# Patient Record
Sex: MALE | Race: WHITE | NOT HISPANIC OR LATINO | Employment: FULL TIME | URBAN - METROPOLITAN AREA
[De-identification: names, ages, dates, MRNs, and addresses within clinical notes are randomized per-mention and may not be internally consistent; named-entity substitution may affect disease eponyms.]

---

## 2019-02-28 LAB
INFLUENZA A ANTIGEN, POC: POSITIVE
INFLUENZA B ANTIGEN, POC: POSITIVE
RAPID GROUP A STREP (OHS): NEGATIVE

## 2020-05-27 ENCOUNTER — HISTORICAL (OUTPATIENT)
Dept: ADMINISTRATIVE | Facility: HOSPITAL | Age: 25
End: 2020-05-27

## 2020-05-27 LAB
ABS NEUT (OLG): 4.3 X10(3)/MCL (ref 2.1–9.2)
ALBUMIN SERPL-MCNC: 4.8 GM/DL (ref 3.4–5)
ALBUMIN/GLOB SERPL: 1.78 {RATIO} (ref 1.5–2.5)
ALP SERPL-CCNC: 72 UNIT/L (ref 38–126)
ALT SERPL-CCNC: 51 UNIT/L (ref 7–52)
APPEARANCE, UA: CLEAR
AST SERPL-CCNC: 30 UNIT/L (ref 15–37)
BACTERIA #/AREA URNS AUTO: ABNORMAL /HPF
BILIRUB SERPL-MCNC: 0.8 MG/DL (ref 0.2–1)
BILIRUB UR QL STRIP: ABNORMAL MG/DL
BILIRUBIN DIRECT+TOT PNL SERPL-MCNC: 0.2 MG/DL (ref 0–0.5)
BILIRUBIN DIRECT+TOT PNL SERPL-MCNC: 0.6 MG/DL
BUN SERPL-MCNC: 14 MG/DL (ref 7–18)
CALCIUM SERPL-MCNC: 9.8 MG/DL (ref 8.5–10)
CHLORIDE SERPL-SCNC: 107 MMOL/L (ref 98–107)
CHOLEST SERPL-MCNC: 165 MG/DL (ref 0–200)
CHOLEST/HDLC SERPL: 4.6 {RATIO}
CO2 SERPL-SCNC: 26 MMOL/L (ref 21–32)
COLOR UR: YELLOW
CREAT SERPL-MCNC: 1.11 MG/DL (ref 0.6–1.3)
ERYTHROCYTE [DISTWIDTH] IN BLOOD BY AUTOMATED COUNT: 13 % (ref 11.5–17)
GLOBULIN SER-MCNC: 2.7 GM/DL (ref 1.2–3)
GLUCOSE (UA): NEGATIVE MG/DL
GLUCOSE SERPL-MCNC: 83 MG/DL (ref 74–106)
HCT VFR BLD AUTO: 48.6 % (ref 42–52)
HDLC SERPL-MCNC: 36 MG/DL (ref 35–60)
HGB BLD-MCNC: 16.8 GM/DL (ref 14–18)
HGB UR QL STRIP: NEGATIVE UNIT/L
KETONES UR QL STRIP: ABNORMAL MG/DL
LDLC SERPL CALC-MCNC: 110 MG/DL (ref 0–129)
LEUKOCYTE ESTERASE UR QL STRIP: NEGATIVE UNIT/L
LYMPHOCYTES # BLD AUTO: 3.4 X10(3)/MCL (ref 0.6–3.4)
LYMPHOCYTES NFR BLD AUTO: 39.9 % (ref 13–40)
MCH RBC QN AUTO: 29.5 PG (ref 27–31.2)
MCHC RBC AUTO-ENTMCNC: 35 GM/DL (ref 32–36)
MCV RBC AUTO: 85 FL (ref 80–94)
MONOCYTES # BLD AUTO: 0.7 X10(3)/MCL (ref 0.1–1.3)
MONOCYTES NFR BLD AUTO: 8.3 % (ref 0.1–24)
NEUTROPHILS NFR BLD AUTO: 51.8 % (ref 47–80)
NITRITE UR QL STRIP.AUTO: NEGATIVE
PH UR STRIP: 6 [PH]
PLATELET # BLD AUTO: 243 X10(3)/MCL (ref 130–400)
PMV BLD AUTO: 9.6 FL (ref 9.4–12.4)
POTASSIUM SERPL-SCNC: 4.3 MMOL/L (ref 3.5–5.1)
PROT SERPL-MCNC: 7.5 GM/DL (ref 6.4–8.2)
PROT UR QL STRIP: NEGATIVE MG/DL
RBC # BLD AUTO: 5.7 X10(6)/MCL (ref 4.7–6.1)
RBC #/AREA URNS HPF: ABNORMAL /HPF
SODIUM SERPL-SCNC: 142 MMOL/L (ref 136–145)
SP GR UR STRIP: 1.02
SQUAMOUS EPITHELIAL, UA: ABNORMAL /LPF
TRIGL SERPL-MCNC: 116 MG/DL (ref 30–150)
UROBILINOGEN UR STRIP-ACNC: 0.2 MG/DL
VLDLC SERPL CALC-MCNC: 23.2 MG/DL
WBC # SPEC AUTO: 8.4 X10(3)/MCL (ref 4.5–11.5)
WBC #/AREA URNS AUTO: ABNORMAL /[HPF]

## 2022-04-10 ENCOUNTER — HISTORICAL (OUTPATIENT)
Dept: ADMINISTRATIVE | Facility: HOSPITAL | Age: 27
End: 2022-04-10

## 2022-04-25 VITALS
HEIGHT: 69 IN | DIASTOLIC BLOOD PRESSURE: 80 MMHG | BODY MASS INDEX: 32.43 KG/M2 | OXYGEN SATURATION: 97 % | WEIGHT: 218.94 LBS | SYSTOLIC BLOOD PRESSURE: 118 MMHG

## 2022-07-18 PROBLEM — F90.0 ATTENTION DEFICIT HYPERACTIVITY DISORDER (ADHD), PREDOMINANTLY INATTENTIVE TYPE: Status: ACTIVE | Noted: 2022-07-18

## 2022-07-18 PROBLEM — F32.A DEPRESSIVE DISORDER: Status: ACTIVE | Noted: 2022-07-18

## 2022-07-18 PROBLEM — E66.9 OBESITY: Status: ACTIVE | Noted: 2022-07-18

## 2022-07-18 PROBLEM — F41.1 GENERALIZED ANXIETY DISORDER: Status: ACTIVE | Noted: 2022-07-18

## 2022-07-19 PROBLEM — R03.0 ELEVATED BLOOD-PRESSURE READING WITHOUT DIAGNOSIS OF HYPERTENSION: Status: ACTIVE | Noted: 2022-07-19

## 2022-09-18 ENCOUNTER — HISTORICAL (OUTPATIENT)
Dept: ADMINISTRATIVE | Facility: HOSPITAL | Age: 27
End: 2022-09-18

## 2022-11-16 PROBLEM — R03.0 ELEVATED BLOOD-PRESSURE READING WITHOUT DIAGNOSIS OF HYPERTENSION: Status: RESOLVED | Noted: 2022-07-19 | Resolved: 2022-11-16

## 2022-11-16 PROBLEM — I10 HYPERTENSION: Status: ACTIVE | Noted: 2022-11-16

## 2022-12-12 PROBLEM — Z00.00 WELLNESS EXAMINATION: Status: ACTIVE | Noted: 2022-12-12

## 2023-03-13 PROBLEM — Z00.00 WELLNESS EXAMINATION: Status: RESOLVED | Noted: 2022-12-12 | Resolved: 2023-03-13

## 2023-03-17 ENCOUNTER — PATIENT MESSAGE (OUTPATIENT)
Dept: RESEARCH | Facility: HOSPITAL | Age: 28
End: 2023-03-17

## 2023-10-05 PROBLEM — Z23 IMMUNIZATION DUE: Status: ACTIVE | Noted: 2023-10-05

## 2023-11-01 PROBLEM — E66.09 OBESITY DUE TO EXCESS CALORIES WITHOUT SERIOUS COMORBIDITY: Status: ACTIVE | Noted: 2022-07-18

## 2023-11-03 PROBLEM — E66.811 CLASS 1 OBESITY DUE TO EXCESS CALORIES WITHOUT SERIOUS COMORBIDITY WITH BODY MASS INDEX (BMI) OF 34.0 TO 34.9 IN ADULT: Status: ACTIVE | Noted: 2022-07-18

## 2024-02-05 PROBLEM — Z00.00 WELLNESS EXAMINATION: Status: RESOLVED | Noted: 2022-12-12 | Resolved: 2024-02-05

## 2024-03-11 DIAGNOSIS — F32.A DEPRESSIVE DISORDER: ICD-10-CM

## 2024-03-11 RX ORDER — VENLAFAXINE HYDROCHLORIDE 75 MG/1
75 CAPSULE, EXTENDED RELEASE ORAL DAILY
Qty: 30 CAPSULE | Refills: 11 | Status: SHIPPED | OUTPATIENT
Start: 2024-03-11 | End: 2024-04-15 | Stop reason: DRUGHIGH

## 2024-04-14 NOTE — PROGRESS NOTES
.ADHD (Refill Vyvanse and Adderall )         HPI:    Patient presents for recheck/refill medications.  Patient states medications are working well; he is able to concentrate, focus and passed.  He denies anxiety, palpitations, unintentional weight loss, headaches or dry mouth.   reviewed.    Narcotics agreement updated 07/03/2023.    Current Outpatient Medications:     lisinopriL-hydrochlorothiazide (PRINZIDE,ZESTORETIC) 20-25 mg Tab, Take 1 tablet by mouth once daily., Disp: 30 tablet, Rfl: 11    venlafaxine (EFFEXOR-XR) 75 MG 24 hr capsule, Take 1 capsule by mouth once daily, Disp: 30 capsule, Rfl: 11    dextroamphetamine-amphetamine (ADDERALL) 20 mg tablet, Take 1 tablet by mouth once daily. In the afternoon, Disp: 30 tablet, Rfl: 0    dextroamphetamine-amphetamine (ADDERALL) 20 mg tablet, Take 1 tablet by mouth daily in afternoon., Disp: 30 tablet, Rfl: 0    dextroamphetamine-amphetamine (ADDERALL) 20 mg tablet, Take 1 tablet by mouth once daily. Fill: 3/15/2024., Disp: 30 tablet, Rfl: 0    lisdexamfetamine (VYVANSE) 70 MG capsule, Take 1 capsule (70 mg total) by mouth every morning., Disp: 30 capsule, Rfl: 0    lisdexamfetamine (VYVANSE) 70 MG capsule, Take 1 capsule (70 mg total) by mouth every morning. Fill: 2/16/2024., Disp: 30 capsule, Rfl: 0    lisdexamfetamine (VYVANSE) 70 MG capsule, Take 1 capsule (70 mg total) by mouth every morning., Disp: 30 capsule, Rfl: 0      ROS:    He has been feeling well.    He feels his anxiety has been increased; multifactorial. No panic attacks.  He denies any sadness or depression  He has been sleeping well for the most part. He feels tired in am. He has excessive daytime sleepiness at times. + snoring. His sleep is noisy at times.  He started to go to gym in the morning 3 days a week.  His appetite has been good; he cut out sodas/soft drinks.   He does not check his blood pressures regularly; 140's/80's.        PE:    ..  Vitals:    01/18/24 0905   BP: 136/86   Pulse:  110   Temp: 98.4 °F (36.9 °C)        General:  He is a well-developed well-nourished obese white male in no apparent distress.  He is alert and oriented.    Chest: Clear to auscultation bilaterally.    CV:  Regular rate rhythm without murmurs rubs gallops    Problem List as of 4/15/2024 Reviewed: 4/15/2024 10:47 AM by Rajinder Patton MD         Psychiatric    Attention deficit hyperactivity disorder (ADHD), predominantly inattentive type    Last Assessment & Plan 7/19/2022 Office Visit Written 7/19/2022  4:50 PM by Rajinder Patton MD     Patient is doing well on some medications; refills x 3 for each given.         Depressive disorder    Last Assessment & Plan 7/19/2022 Office Visit Written 7/19/2022  4:51 PM by Rajinder Patton MD     Patient states his mood has been doing well; continue current medication.         Generalized anxiety disorder    Last Assessment & Plan 7/19/2022 Office Visit Written 7/19/2022  4:51 PM by Rajinder Patton MD     Patient states his anxiety is well controlled; continue current medication.            Cardiac/Vascular    Primary hypertension       Endocrine    Class 1 obesity due to excess calories without serious comorbidity with body mass index (BMI) of 34.0 to 34.9 in adult       Other    Suspected sleep apnea            1. Attention deficit hyperactivity disorder (ADHD), predominantly inattentive type  Overview:  Stable/patient doing well on current treatment, will continue to closely monitor, follow-up in 3 months. 6 prescriptions printed and hand given to patient at office visit today for 3-month supply. Risks/benefits/side effects of medication discussed with patient. Patient denies any insomnia or palpitations.     07/03/2023: Patient is doing well on medications; refills x3 for each medication given.    Narcotics agreement updated.    10/05/2023: Patient is doing well on medications; refills x3 for each medication given.    10/05/2023:  Patient took Vyvanse 50 mg secondary to  availability issues.  Will resume 70 mg dosage; refills x3 given.  Continue Adderall 20 mg; refills x3 given.    01/18/2024: Patient is doing well on medications; refills x3 for each medication given.    Orders:  -     lisdexamfetamine (VYVANSE) 70 MG capsule; Take 1 capsule (70 mg total) by mouth every morning.  Dispense: 30 capsule; Refill: 0  -     dextroamphetamine-amphetamine (ADDERALL) 20 mg tablet; Take 1 tablet by mouth once daily. In the afternoon  Dispense: 30 tablet; Refill: 0    2. Primary hypertension  Overview:  Stable and well controlled at this time. Continue current treatment. Limit salt in diet. Monitor BP at home.     07/03/2023: Patient's blood pressure continues to be well controlled.    Continue current medication.    Patient encouraged to lose weight.    10/05/2023:  His blood pressure is well controlled.    11/03/2023: His blood pressure is well controlled; continue current medication.    01/18/2024:  Patient's blood pressure is 136/86 today; he has not been checking his blood pressures.  Patient advised to monitor pressures and let us know how they are doing.  Limit sodium consumption.    Patient encouraged to increase physical activity, exercise and lose weight.          3. Class 1 obesity due to excess calories without serious comorbidity with body mass index (BMI) of 34.0 to 34.9 in adult  Overview:  Patient encouraged to make healthy food choices and limit portions.    Patient encouraged to limit intake of fried foods, processed foods and sugary foods.    Patient encouraged to increase physical activity, exercise and lose weight.    01/18/2024:  Patient encouraged to make healthy food choices and limit portions.    Patient encouraged to limit intake of fried foods, processed foods and sugary foods.    Patient encouraged to increase physical activity, exercise and lose weight.      Other orders  -     lisdexamfetamine (VYVANSE) 70 MG capsule; Take 1 capsule (70 mg total) by mouth every  morning. Fill: 2/16/2024.  Dispense: 30 capsule; Refill: 0  -     lisdexamfetamine (VYVANSE) 70 MG capsule; Take 1 capsule (70 mg total) by mouth every morning.  Dispense: 30 capsule; Refill: 0  -     dextroamphetamine-amphetamine (ADDERALL) 20 mg tablet; Take 1 tablet by mouth daily in afternoon.  Dispense: 30 tablet; Refill: 0  -     dextroamphetamine-amphetamine (ADDERALL) 20 mg tablet; Take 1 tablet by mouth once daily. Fill: 3/15/2024.  Dispense: 30 tablet; Refill: 0              ..Follow up in about 3 months (around 4/18/2024) for Recheck/refill meds.

## 2024-04-15 ENCOUNTER — OFFICE VISIT (OUTPATIENT)
Dept: PRIMARY CARE CLINIC | Facility: CLINIC | Age: 29
End: 2024-04-15
Payer: COMMERCIAL

## 2024-04-15 VITALS
SYSTOLIC BLOOD PRESSURE: 142 MMHG | HEART RATE: 97 BPM | BODY MASS INDEX: 34.58 KG/M2 | HEIGHT: 68 IN | RESPIRATION RATE: 18 BRPM | DIASTOLIC BLOOD PRESSURE: 87 MMHG | TEMPERATURE: 98 F | WEIGHT: 228.13 LBS | OXYGEN SATURATION: 95 %

## 2024-04-15 DIAGNOSIS — F90.0 ATTENTION DEFICIT HYPERACTIVITY DISORDER (ADHD), PREDOMINANTLY INATTENTIVE TYPE: Primary | ICD-10-CM

## 2024-04-15 DIAGNOSIS — F41.1 GENERALIZED ANXIETY DISORDER: ICD-10-CM

## 2024-04-15 DIAGNOSIS — F32.A DEPRESSIVE DISORDER: ICD-10-CM

## 2024-04-15 DIAGNOSIS — R29.818 SUSPECTED SLEEP APNEA: ICD-10-CM

## 2024-04-15 DIAGNOSIS — I10 PRIMARY HYPERTENSION: ICD-10-CM

## 2024-04-15 PROCEDURE — 1159F MED LIST DOCD IN RCRD: CPT | Mod: CPTII,,, | Performed by: FAMILY MEDICINE

## 2024-04-15 PROCEDURE — 3079F DIAST BP 80-89 MM HG: CPT | Mod: CPTII,,, | Performed by: FAMILY MEDICINE

## 2024-04-15 PROCEDURE — 3008F BODY MASS INDEX DOCD: CPT | Mod: CPTII,,, | Performed by: FAMILY MEDICINE

## 2024-04-15 PROCEDURE — 3077F SYST BP >= 140 MM HG: CPT | Mod: CPTII,,, | Performed by: FAMILY MEDICINE

## 2024-04-15 PROCEDURE — 99214 OFFICE O/P EST MOD 30 MIN: CPT | Mod: ,,, | Performed by: FAMILY MEDICINE

## 2024-04-15 PROCEDURE — 4010F ACE/ARB THERAPY RXD/TAKEN: CPT | Mod: CPTII,,, | Performed by: FAMILY MEDICINE

## 2024-04-15 RX ORDER — VENLAFAXINE HYDROCHLORIDE 150 MG/1
150 CAPSULE, EXTENDED RELEASE ORAL DAILY
Qty: 30 CAPSULE | Refills: 1 | Status: SHIPPED | OUTPATIENT
Start: 2024-04-15 | End: 2024-06-14

## 2024-04-15 RX ORDER — LISDEXAMFETAMINE DIMESYLATE 70 MG/1
70 CAPSULE ORAL EVERY MORNING
Qty: 30 CAPSULE | Refills: 0 | Status: SHIPPED | OUTPATIENT
Start: 2024-04-15

## 2024-04-15 RX ORDER — LISDEXAMFETAMINE DIMESYLATE 70 MG/1
70 CAPSULE ORAL EVERY MORNING
Qty: 30 CAPSULE | Refills: 0 | Status: SHIPPED | OUTPATIENT
Start: 2024-04-15 | End: 2024-05-15

## 2024-04-15 RX ORDER — PROMETHAZINE HYDROCHLORIDE AND DEXTROMETHORPHAN HYDROBROMIDE 6.25; 15 MG/5ML; MG/5ML
SYRUP ORAL
COMMUNITY
Start: 2023-12-06

## 2024-04-15 RX ORDER — DEXTROAMPHETAMINE SACCHARATE, AMPHETAMINE ASPARTATE, DEXTROAMPHETAMINE SULFATE AND AMPHETAMINE SULFATE 5; 5; 5; 5 MG/1; MG/1; MG/1; MG/1
1 TABLET ORAL DAILY
Qty: 30 TABLET | Refills: 0 | Status: SHIPPED | OUTPATIENT
Start: 2024-04-15

## 2024-04-15 RX ORDER — DEXTROAMPHETAMINE SACCHARATE, AMPHETAMINE ASPARTATE, DEXTROAMPHETAMINE SULFATE AND AMPHETAMINE SULFATE 5; 5; 5; 5 MG/1; MG/1; MG/1; MG/1
TABLET ORAL
Qty: 30 TABLET | Refills: 0 | Status: SHIPPED | OUTPATIENT
Start: 2024-04-15

## 2024-04-26 ENCOUNTER — PATIENT MESSAGE (OUTPATIENT)
Dept: PRIMARY CARE CLINIC | Facility: CLINIC | Age: 29
End: 2024-04-26
Payer: COMMERCIAL

## 2024-05-06 ENCOUNTER — TELEPHONE (OUTPATIENT)
Dept: PRIMARY CARE CLINIC | Facility: CLINIC | Age: 29
End: 2024-05-06
Payer: COMMERCIAL

## 2024-05-06 NOTE — TELEPHONE ENCOUNTER
----- Message from Shyann Mariee sent at 5/6/2024 10:24 AM CDT -----  Regarding: General Message  General Message:    Beverley soliman/ [facility] @ [phone], she stated she did received the referral but they are needing an demo sheet, dx code and last office visit faxed to [phone]

## 2024-05-10 ENCOUNTER — PATIENT MESSAGE (OUTPATIENT)
Dept: PRIMARY CARE CLINIC | Facility: CLINIC | Age: 29
End: 2024-05-10
Payer: COMMERCIAL

## 2024-06-26 ENCOUNTER — PATIENT OUTREACH (OUTPATIENT)
Facility: CLINIC | Age: 29
End: 2024-06-26
Payer: COMMERCIAL

## 2024-06-26 NOTE — PROGRESS NOTES
Value base Outreach  Call to patient to obtain remote BP, no answer left message. Health maintenance Topics

## 2024-07-20 NOTE — PROGRESS NOTES
I have reviewed the patient name: Gerardo Be, YOB: 1995   I have verbally reviewed the past medical history, active medication list, and allergies with the patient (parent/ legal guardian for a patient under the age of 18 years old) and verified with picture ID if applicable .  I have verified that this patient is in the state [facility].      HPI:    Patient presents for recheck/refill medications.  Patient states medications are working well; he is able to concentrate, focus and passed.  He denies anxiety, palpitations, unintentional weight loss, headaches or dry mouth.   reviewed.          Current Outpatient Medications   Medication Instructions    dextroamphetamine-amphetamine (ADDERALL) 20 mg tablet 1 tablet, Oral, Daily, In the afternoon    dextroamphetamine-amphetamine (ADDERALL) 20 mg tablet Take 1 tablet by mouth daily in afternoon. Fill: 8/23/2024.    dextroamphetamine-amphetamine (ADDERALL) 20 mg tablet Take 1 tablet by mouth after lunch. Fill: 9/23/2024.    lisdexamfetamine (VYVANSE) 70 mg, Oral, Every morning    lisdexamfetamine (VYVANSE) 70 mg, Oral, Every morning, Fill: 8/23/2024.    lisdexamfetamine (VYVANSE) 70 mg, Oral, Every morning, Fill: 9/23/2024.    lisinopriL-hydrochlorothiazide (PRINZIDE,ZESTORETIC) 20-25 mg Tab 1 tablet, Oral, Daily    venlafaxine (EFFEXOR-XR) 75 mg, Oral         ROS:    He has been feeling well.    He has been sleeping okay. He had a sleep study which confirmed   His appetite has been good.  He has been eating smaller portions.  His anxiety and depression have been      PE:    ..There were no vitals taken for this visit.     General:  He is a well-developed well-nourished obese white male in no apparent distress.  He is alert and oriented.            1. Attention deficit hyperactivity disorder (ADHD), predominantly inattentive type  Overview:  Stable/patient doing well on current treatment, will continue to closely monitor, follow-up in 3 months. 6  prescriptions printed and hand given to patient at office visit today for 3-month supply. Risks/benefits/side effects of medication discussed with patient. Patient denies any insomnia or palpitations.     07/03/2023: Patient is doing well on medications; refills x3 for each medication given.    Narcotics agreement updated.    10/05/2023: Patient is doing well on medications; refills x3 for each medication given.    10/05/2023:  Patient took Vyvanse 50 mg secondary to availability issues.  Will resume 70 mg dosage; refills x3 given.  Continue Adderall 20 mg; refills x3 given.    01/18/2024: Patient is doing well on medications; refills x3 for each medication given.    04/15/2024: Patient is doing well on medications; refills x3 for each medication given.    07/23/2024: Patient is doing well on medications; refills x3 for each medications sent.    Orders:  -     dextroamphetamine-amphetamine (ADDERALL) 20 mg tablet; Take 1 tablet by mouth once daily. In the afternoon  Dispense: 30 tablet; Refill: 0  -     lisdexamfetamine (VYVANSE) 70 MG capsule; Take 1 capsule (70 mg total) by mouth every morning.  Dispense: 30 capsule; Refill: 0    2. Generalized anxiety disorder  Overview:  Stable with Effexor 75mg.     11/03/2023: Patient states his anxiety has been doing well; denies any panic attacks.    Continue current medication.    04/15/2024:  Patient reports an increase in anxiety lately; he would like to increase his venlafaxine dosage.    Will increase to 150 mg daily.    Patient advised to call in one-month with update.  He should call sooner if his symptoms worsen.    07/23/2024:  Patient reports his anxiety has improved.    His work is stressful at times.    He felt apathetic on 150 mg dosage; therefore, he is back on 75 mg dosage.    Patient to keep me posted with his progress.      3. Depressive disorder  Overview:  Patient is stable on current medication.  Patient denies any suicidal harmful or  thoughts.    11/03/2023:  Patient states his depression has been doing well; he denies any sadness, depression or crying spells.  He denies any suicidal thoughts.  Continue current medication.    04/15/2024: Patient states his depression has been increased; he denies any suicidal or harmful thoughts.    Will increase venlafaxine extended release 150 mg daily.      07/23/2024:  Patient felt apathetic on 150 mg dosage.  Therefore, he decrease his dose back to 75 daily.  He states his depression has improved; he is trying to be more physically active.  He will let me know if we need to increase his dosage; I advised him that we could go up by 37.5 mg as needed.  Patient denies any suicidal or harmful thoughts; ER precautions given.      4. Primary hypertension  Overview:  Stable and well controlled at this time. Continue current treatment. Limit salt in diet. Monitor BP at home.     07/03/2023: Patient's blood pressure continues to be well controlled.    Continue current medication.    Patient encouraged to lose weight.    10/05/2023:  His blood pressure is well controlled.    11/03/2023: His blood pressure is well controlled; continue current medication.    01/18/2024:  Patient's blood pressure is 136/86 today; he has not been checking his blood pressures.  Patient advised to monitor pressures and let us know how they are doing.  Limit sodium consumption.    Patient encouraged to increase physical activity, exercise and lose weight.    04/15/2024:  His blood pressure is not ideally controlled.    Patient encouraged to lose weight.    Limit sodium consumption.    Patient advised to monitor pressures and let us know how they are doing.    07/23/2024: Patient states his blood pressure has been controlled; his blood pressure this a.m. was 112/84.          5. Obstructive sleep apnea syndrome  Overview:  07/23/2024: Patient did a home sleep study which confirmed that he had obstructive sleep apnea.    Patient picked up mask  this a.m.      Other orders  -     dextroamphetamine-amphetamine (ADDERALL) 20 mg tablet; Take 1 tablet by mouth daily in afternoon. Fill: 8/23/2024.  Dispense: 30 tablet; Refill: 0  -     dextroamphetamine-amphetamine (ADDERALL) 20 mg tablet; Take 1 tablet by mouth after lunch. Fill: 9/23/2024.  Dispense: 30 tablet; Refill: 0  -     lisdexamfetamine (VYVANSE) 70 MG capsule; Take 1 capsule (70 mg total) by mouth every morning. Fill: 8/23/2024.  Dispense: 30 capsule; Refill: 0  -     lisdexamfetamine (VYVANSE) 70 MG capsule; Take 1 capsule (70 mg total) by mouth every morning. Fill: 9/23/2024.  Dispense: 30 capsule; Refill: 0        Note: A total of 30 minutes was spent reviewing patient's chart, talking to patient and documentation.  Note: This was an audio visual visit.      ..No follow-ups on file.       No future appointments.      Answers submitted by the patient for this visit:  Review of Systems Questionnaire (Submitted on 7/23/2024)  activity change: Yes  unexpected weight change: No  neck pain: No  hearing loss: No  rhinorrhea: No  trouble swallowing: No  eye discharge: No  visual disturbance: No  chest tightness: No  wheezing: No  chest pain: No  palpitations: No  blood in stool: No  constipation: No  vomiting: No  diarrhea: No  polydipsia: No  polyuria: No  difficulty urinating: No  urgency: No  hematuria: No  joint swelling: No  arthralgias: No  headaches: No  weakness: No  confusion: No  dysphoric mood: No

## 2024-07-23 ENCOUNTER — OFFICE VISIT (OUTPATIENT)
Dept: PRIMARY CARE CLINIC | Facility: CLINIC | Age: 29
End: 2024-07-23
Payer: COMMERCIAL

## 2024-07-23 DIAGNOSIS — G47.33 OBSTRUCTIVE SLEEP APNEA SYNDROME: ICD-10-CM

## 2024-07-23 DIAGNOSIS — I10 PRIMARY HYPERTENSION: ICD-10-CM

## 2024-07-23 DIAGNOSIS — F90.0 ATTENTION DEFICIT HYPERACTIVITY DISORDER (ADHD), PREDOMINANTLY INATTENTIVE TYPE: Primary | ICD-10-CM

## 2024-07-23 DIAGNOSIS — F32.A DEPRESSIVE DISORDER: ICD-10-CM

## 2024-07-23 DIAGNOSIS — F41.1 GENERALIZED ANXIETY DISORDER: ICD-10-CM

## 2024-07-23 PROBLEM — G47.30 SLEEP APNEA, UNSPECIFIED: Status: ACTIVE | Noted: 2024-05-01

## 2024-07-23 PROCEDURE — 1159F MED LIST DOCD IN RCRD: CPT | Mod: CPTII,95,, | Performed by: FAMILY MEDICINE

## 2024-07-23 PROCEDURE — 99214 OFFICE O/P EST MOD 30 MIN: CPT | Mod: 95,,, | Performed by: FAMILY MEDICINE

## 2024-07-23 PROCEDURE — 4010F ACE/ARB THERAPY RXD/TAKEN: CPT | Mod: CPTII,95,, | Performed by: FAMILY MEDICINE

## 2024-07-23 RX ORDER — VENLAFAXINE HYDROCHLORIDE 75 MG/1
75 CAPSULE, EXTENDED RELEASE ORAL
COMMUNITY
Start: 2024-07-03

## 2024-07-23 RX ORDER — LISDEXAMFETAMINE DIMESYLATE 70 MG/1
70 CAPSULE ORAL EVERY MORNING
Qty: 30 CAPSULE | Refills: 0 | Status: SHIPPED | OUTPATIENT
Start: 2024-07-23

## 2024-07-23 RX ORDER — DEXTROAMPHETAMINE SACCHARATE, AMPHETAMINE ASPARTATE, DEXTROAMPHETAMINE SULFATE AND AMPHETAMINE SULFATE 5; 5; 5; 5 MG/1; MG/1; MG/1; MG/1
TABLET ORAL
Qty: 30 TABLET | Refills: 0 | Status: SHIPPED | OUTPATIENT
Start: 2024-07-23

## 2024-07-23 RX ORDER — DEXTROAMPHETAMINE SACCHARATE, AMPHETAMINE ASPARTATE, DEXTROAMPHETAMINE SULFATE AND AMPHETAMINE SULFATE 5; 5; 5; 5 MG/1; MG/1; MG/1; MG/1
1 TABLET ORAL DAILY
Qty: 30 TABLET | Refills: 0 | Status: SHIPPED | OUTPATIENT
Start: 2024-07-23

## 2024-07-23 RX ORDER — LISDEXAMFETAMINE DIMESYLATE 70 MG/1
70 CAPSULE ORAL EVERY MORNING
Qty: 30 CAPSULE | Refills: 0 | Status: SHIPPED | OUTPATIENT
Start: 2024-07-23 | End: 2024-08-22

## 2024-10-22 ENCOUNTER — PATIENT OUTREACH (OUTPATIENT)
Facility: CLINIC | Age: 29
End: 2024-10-22
Payer: COMMERCIAL

## 2024-10-22 VITALS — DIASTOLIC BLOOD PRESSURE: 84 MMHG | SYSTOLIC BLOOD PRESSURE: 112 MMHG

## 2024-10-22 NOTE — PROGRESS NOTES
I have reviewed the patient name: Gerardo Be, YOB: 1995   I have verbally reviewed the past medical history, active medication list, and allergies with the patient (parent/ legal guardian for a patient under the age of 18 years old) and verified with picture ID if applicable .  I have verified that this patient is in the state [facility].     Medication Refill       HPI:    Patient presents for recheck/refill medications.  Patient states medications are working well; he is able to concentrate, focus and passed.  He denies anxiety, palpitations, unintentional weight loss, headaches or dry mouth.   reviewed.      Current Outpatient Medications   Medication Instructions    dextroamphetamine-amphetamine (ADDERALL) 20 mg tablet 1 tablet, Oral, Daily, In the afternoon    dextroamphetamine-amphetamine (ADDERALL) 20 mg tablet Take 1 tablet by mouth daily in afternoon. Fill: 11/22/2024.    dextroamphetamine-amphetamine (ADDERALL) 20 mg tablet Take 1 tablet by mouth after lunch. Fill: 12/23/2024.    lisdexamfetamine (VYVANSE) 70 mg, Oral, Every morning    lisdexamfetamine (VYVANSE) 70 mg, Oral, Every morning, Fill: 11/22/2024.    lisdexamfetamine (VYVANSE) 70 mg, Oral, Every morning, Fill: 12/23/2024.    lisinopriL (PRINIVIL,ZESTRIL) 20 mg, Oral, Daily    lisinopriL-hydrochlorothiazide (PRINZIDE,ZESTORETIC) 20-25 mg Tab 1 tablet, Oral, Daily    venlafaxine (EFFEXOR-XR) 75 mg         ROS:    He has been feeling well.    He has been sleeping   His appetite has been good.  He has been eating smaller portions.  His anxiety and depression have been doing well.  His home blood pressures has been in the low 140s over low 90s.      PE:    ..There were no vitals taken for this visit.     Via telemedicine, he is well-developed well-nourished obese white male in no apparent distress.  He is alert and oriented.  His affect is appropriate.        1. Attention deficit hyperactivity disorder (ADHD), predominantly  inattentive type  Overview:  Stable/patient doing well on current treatment, will continue to closely monitor, follow-up in 3 months. 6 prescriptions printed and hand given to patient at office visit today for 3-month supply. Risks/benefits/side effects of medication discussed with patient. Patient denies any insomnia or palpitations.     07/03/2023: Patient is doing well on medications; refills x3 for each medication given.    Narcotics agreement updated.    10/05/2023: Patient is doing well on medications; refills x3 for each medication given.    10/05/2023:  Patient took Vyvanse 50 mg secondary to availability issues.  Will resume 70 mg dosage; refills x3 given.  Continue Adderall 20 mg; refills x3 given.    01/18/2024: Patient is doing well on medications; refills x3 for each medication given.    04/15/2024: Patient is doing well on medications; refills x3 for each medication given.    07/23/2024: Patient is doing well on medications; refills x3 for each medications sent.    Assessment & Plan:  Patient is doing well on medications; refills x3 for each medications sent.    Orders:  -     dextroamphetamine-amphetamine (ADDERALL) 20 mg tablet; Take 1 tablet by mouth once daily. In the afternoon  Dispense: 30 tablet; Refill: 0    2. Generalized anxiety disorder  Overview:  Stable with Effexor 75mg.     11/03/2023: Patient states his anxiety has been doing well; denies any panic attacks.    Continue current medication.    04/15/2024:  Patient reports an increase in anxiety lately; he would like to increase his venlafaxine dosage.    Will increase to 150 mg daily.    Patient advised to call in one-month with update.  He should call sooner if his symptoms worsen.    07/23/2024:  Patient reports his anxiety has improved.    His work is stressful at times.    He felt apathetic on 150 mg dosage; therefore, he is back on 75 mg dosage.    Patient to keep me posted with his progress.    Assessment & Plan:  His anxiety has  been doing well; continue current medication.      3. Depressive disorder  Overview:  Patient is stable on current medication.  Patient denies any suicidal harmful or thoughts.    11/03/2023:  Patient states his depression has been doing well; he denies any sadness, depression or crying spells.  He denies any suicidal thoughts.  Continue current medication.    04/15/2024: Patient states his depression has been increased; he denies any suicidal or harmful thoughts.    Will increase venlafaxine extended release 150 mg daily.      07/23/2024:  Patient felt apathetic on 150 mg dosage.  Therefore, he decrease his dose back to 75 daily.  He states his depression has improved; he is trying to be more physically active.  He will let me know if we need to increase his dosage; I advised him that we could go up by 37.5 mg as needed.  Patient denies any suicidal or harmful thoughts; ER precautions given.    Assessment & Plan:  Patient states his depression has been doing well; denies any sadness, depression, crying spells or suicidal thoughts.  Continue venlafaxine.      4. Primary hypertension  Overview:  Stable and well controlled at this time. Continue current treatment. Limit salt in diet. Monitor BP at home.     07/03/2023: Patient's blood pressure continues to be well controlled.    Continue current medication.    Patient encouraged to lose weight.    10/05/2023:  His blood pressure is well controlled.    11/03/2023: His blood pressure is well controlled; continue current medication.    01/18/2024:  Patient's blood pressure is 136/86 today; he has not been checking his blood pressures.  Patient advised to monitor pressures and let us know how they are doing.  Limit sodium consumption.    Patient encouraged to increase physical activity, exercise and lose weight.    04/15/2024:  His blood pressure is not ideally controlled.    Patient encouraged to lose weight.    Limit sodium consumption.    Patient advised to monitor  pressures and let us know how they are doing.    07/23/2024: Patient states his blood pressure has been controlled; his blood pressure this a.m. was 112/84.        Assessment & Plan:  Patient states his home blood pressures has been in the low 140s over low 90s.  Add lisinopril 20 mg daily.  Continue lisinopril HCT 20-25 mg daily.    Continue to monitor pressures and let us know how they are doing in 2-3 weeks.      Other orders  -     lisdexamfetamine (VYVANSE) 70 MG capsule; Take 1 capsule (70 mg total) by mouth every morning.  Dispense: 30 capsule; Refill: 0  -     lisdexamfetamine (VYVANSE) 70 MG capsule; Take 1 capsule (70 mg total) by mouth every morning. Fill: 11/22/2024.  Dispense: 30 capsule; Refill: 0  -     lisdexamfetamine (VYVANSE) 70 MG capsule; Take 1 capsule (70 mg total) by mouth every morning. Fill: 12/23/2024.  Dispense: 30 capsule; Refill: 0  -     dextroamphetamine-amphetamine (ADDERALL) 20 mg tablet; Take 1 tablet by mouth daily in afternoon. Fill: 11/22/2024.  Dispense: 30 tablet; Refill: 0  -     dextroamphetamine-amphetamine (ADDERALL) 20 mg tablet; Take 1 tablet by mouth after lunch. Fill: 12/23/2024.  Dispense: 30 tablet; Refill: 0  -     lisinopriL (PRINIVIL,ZESTRIL) 20 MG tablet; Take 1 tablet (20 mg total) by mouth once daily.  Dispense: 30 tablet; Refill: 1                ..No follow-ups on file.       No future appointments.        Answers submitted by the patient for this visit:  Review of Systems Questionnaire (Submitted on 10/22/2024)  activity change: No  unexpected weight change: No  neck pain: No  hearing loss: No  rhinorrhea: No  trouble swallowing: No  eye discharge: No  visual disturbance: No  chest tightness: No  wheezing: No  chest pain: No  palpitations: No  blood in stool: No  constipation: No  vomiting: No  diarrhea: No  polydipsia: No  polyuria: No  difficulty urinating: No  urgency: No  hematuria: No  joint swelling: No  arthralgias: No  headaches: No  weakness:  No  confusion: No  dysphoric mood: No

## 2024-10-23 ENCOUNTER — OFFICE VISIT (OUTPATIENT)
Dept: PRIMARY CARE CLINIC | Facility: CLINIC | Age: 29
End: 2024-10-23
Payer: COMMERCIAL

## 2024-10-23 DIAGNOSIS — F41.1 GENERALIZED ANXIETY DISORDER: ICD-10-CM

## 2024-10-23 DIAGNOSIS — I10 PRIMARY HYPERTENSION: ICD-10-CM

## 2024-10-23 DIAGNOSIS — F32.A DEPRESSIVE DISORDER: ICD-10-CM

## 2024-10-23 DIAGNOSIS — F90.0 ATTENTION DEFICIT HYPERACTIVITY DISORDER (ADHD), PREDOMINANTLY INATTENTIVE TYPE: Primary | ICD-10-CM

## 2024-10-23 PROCEDURE — 4010F ACE/ARB THERAPY RXD/TAKEN: CPT | Mod: CPTII,95,, | Performed by: FAMILY MEDICINE

## 2024-10-23 PROCEDURE — 1159F MED LIST DOCD IN RCRD: CPT | Mod: CPTII,95,, | Performed by: FAMILY MEDICINE

## 2024-10-23 PROCEDURE — 99214 OFFICE O/P EST MOD 30 MIN: CPT | Mod: 95,,, | Performed by: FAMILY MEDICINE

## 2024-10-23 RX ORDER — LISDEXAMFETAMINE DIMESYLATE 70 MG/1
70 CAPSULE ORAL EVERY MORNING
Qty: 30 CAPSULE | Refills: 0 | Status: SHIPPED | OUTPATIENT
Start: 2024-10-23 | End: 2024-11-22

## 2024-10-23 RX ORDER — DEXTROAMPHETAMINE SACCHARATE, AMPHETAMINE ASPARTATE, DEXTROAMPHETAMINE SULFATE AND AMPHETAMINE SULFATE 5; 5; 5; 5 MG/1; MG/1; MG/1; MG/1
1 TABLET ORAL DAILY
Qty: 30 TABLET | Refills: 0 | Status: SHIPPED | OUTPATIENT
Start: 2024-10-23

## 2024-10-23 RX ORDER — DEXTROAMPHETAMINE SACCHARATE, AMPHETAMINE ASPARTATE, DEXTROAMPHETAMINE SULFATE AND AMPHETAMINE SULFATE 5; 5; 5; 5 MG/1; MG/1; MG/1; MG/1
TABLET ORAL
Qty: 30 TABLET | Refills: 0 | Status: SHIPPED | OUTPATIENT
Start: 2024-10-23

## 2024-10-23 RX ORDER — LISINOPRIL 20 MG/1
20 TABLET ORAL DAILY
Qty: 30 TABLET | Refills: 1 | Status: SHIPPED | OUTPATIENT
Start: 2024-10-23 | End: 2024-12-22

## 2024-10-23 NOTE — ASSESSMENT & PLAN NOTE
Patient states his home blood pressures has been in the low 140s over low 90s.  Add lisinopril 20 mg daily.  Continue lisinopril HCT 20-25 mg daily.    Continue to monitor pressures and let us know how they are doing in 2-3 weeks.

## 2024-10-23 NOTE — ASSESSMENT & PLAN NOTE
Patient states his depression has been doing well; denies any sadness, depression, crying spells or suicidal thoughts.  Continue venlafaxine.

## 2025-01-24 ENCOUNTER — OFFICE VISIT (OUTPATIENT)
Dept: PRIMARY CARE CLINIC | Facility: CLINIC | Age: 30
End: 2025-01-24
Payer: COMMERCIAL

## 2025-01-24 DIAGNOSIS — F90.0 ATTENTION DEFICIT HYPERACTIVITY DISORDER (ADHD), PREDOMINANTLY INATTENTIVE TYPE: ICD-10-CM

## 2025-01-24 DIAGNOSIS — G47.33 OBSTRUCTIVE SLEEP APNEA SYNDROME: ICD-10-CM

## 2025-01-24 DIAGNOSIS — I10 PRIMARY HYPERTENSION: ICD-10-CM

## 2025-01-24 DIAGNOSIS — F32.A DEPRESSIVE DISORDER: ICD-10-CM

## 2025-01-24 DIAGNOSIS — F41.1 GENERALIZED ANXIETY DISORDER: Primary | ICD-10-CM

## 2025-01-24 RX ORDER — DEXTROAMPHETAMINE SACCHARATE, AMPHETAMINE ASPARTATE, DEXTROAMPHETAMINE SULFATE AND AMPHETAMINE SULFATE 5; 5; 5; 5 MG/1; MG/1; MG/1; MG/1
1 TABLET ORAL DAILY
Qty: 30 TABLET | Refills: 0 | Status: SHIPPED | OUTPATIENT
Start: 2025-01-24

## 2025-01-24 RX ORDER — LISDEXAMFETAMINE DIMESYLATE 70 MG/1
70 CAPSULE ORAL EVERY MORNING
Qty: 30 CAPSULE | Refills: 0 | Status: SHIPPED | OUTPATIENT
Start: 2025-01-24

## 2025-01-24 RX ORDER — DEXTROAMPHETAMINE SACCHARATE, AMPHETAMINE ASPARTATE, DEXTROAMPHETAMINE SULFATE AND AMPHETAMINE SULFATE 5; 5; 5; 5 MG/1; MG/1; MG/1; MG/1
TABLET ORAL
Qty: 30 TABLET | Refills: 0 | Status: SHIPPED | OUTPATIENT
Start: 2025-01-24

## 2025-01-24 RX ORDER — LISDEXAMFETAMINE DIMESYLATE 70 MG/1
70 CAPSULE ORAL EVERY MORNING
Qty: 30 CAPSULE | Refills: 0 | Status: SHIPPED | OUTPATIENT
Start: 2025-01-24 | End: 2025-02-23

## 2025-01-24 RX ORDER — LISINOPRIL 20 MG/1
20 TABLET ORAL DAILY
Qty: 30 TABLET | Refills: 5 | Status: SHIPPED | OUTPATIENT
Start: 2025-01-24 | End: 2025-07-23

## 2025-01-24 NOTE — PROGRESS NOTES
I have reviewed the patient name: Gerardo Be, YOB: 1995   I have verbally reviewed the past medical history, active medication list, and allergies with the patient (parent/ legal guardian for a patient under the age of 18 years old) and verified with picture ID if applicable .  I have verified that this patient is in the [facility].    Medication Refill (3 month med refill)         HPI:      Current Outpatient Medications   Medication Instructions    dextroamphetamine-amphetamine (ADDERALL) 20 mg tablet 1 tablet, Oral, Daily, In the afternoon    dextroamphetamine-amphetamine (ADDERALL) 20 mg tablet Take 1 tablet by mouth daily in afternoon. Fill: 2/24/2025.    dextroamphetamine-amphetamine (ADDERALL) 20 mg tablet Take 1 tablet by mouth after lunch. Fill: 3/24/2025.    lisdexamfetamine (VYVANSE) 70 mg, Oral, Every morning    lisdexamfetamine (VYVANSE) 70 mg, Oral, Every morning, Fill: 2/24/2025.    lisdexamfetamine (VYVANSE) 70 mg, Oral, Every morning, Fill: 3/24/2025.    lisinopriL (PRINIVIL,ZESTRIL) 20 mg, Oral, Daily    lisinopriL-hydrochlorothiazide (PRINZIDE,ZESTORETIC) 20-25 mg Tab 1 tablet, Oral, Daily    venlafaxine (EFFEXOR-XR) 75 mg         ROS:    Answers submitted by the patient for this visit:  Review of Systems Questionnaire (Submitted on 1/23/2025)  activity change: Yes  unexpected weight change: No  neck pain: No  hearing loss: No  rhinorrhea: No  trouble swallowing: No  eye discharge: No  visual disturbance: No  chest tightness: No  wheezing: No  chest pain: No  palpitations: No  blood in stool: No  constipation: No  vomiting: No  diarrhea: No  polydipsia: No  polyuria: No  difficulty urinating: No  urgency: No  hematuria: No  joint swelling: No  arthralgias: No  headaches: No  weakness: No  confusion: No  dysphoric mood: No        PE:    ..There were no vitals taken for this visit.     Via telemedicine, he is a well-developed well-nourished white male in no apparent  distress.  He is alert and oriented.  He has a good affect.        1. Generalized anxiety disorder  Overview:  Stable with Effexor 75mg.     11/03/2023: Patient states his anxiety has been doing well; denies any panic attacks.    Continue current medication.    04/15/2024:  Patient reports an increase in anxiety lately; he would like to increase his venlafaxine dosage.    Will increase to 150 mg daily.    Patient advised to call in one-month with update.  He should call sooner if his symptoms worsen.    07/23/2024:  Patient reports his anxiety has improved.    His work is stressful at times.    He felt apathetic on 150 mg dosage; therefore, he is back on 75 mg dosage.    Patient to keep me posted with his progress.    Assessment & Plan:  Patient states his anxiety has been doing well; he denies any panic attacks.      2. Depressive disorder  Overview:  Patient is stable on current medication.  Patient denies any suicidal harmful or thoughts.    11/03/2023:  Patient states his depression has been doing well; he denies any sadness, depression or crying spells.  He denies any suicidal thoughts.  Continue current medication.    04/15/2024: Patient states his depression has been increased; he denies any suicidal or harmful thoughts.    Will increase venlafaxine extended release 150 mg daily.      07/23/2024:  Patient felt apathetic on 150 mg dosage.  Therefore, he decrease his dose back to 75 daily.  He states his depression has improved; he is trying to be more physically active.  He will let me know if we need to increase his dosage; I advised him that we could go up by 37.5 mg as needed.  Patient denies any suicidal or harmful thoughts; ER precautions given.    Assessment & Plan:  Patient states his depression has been doing well; denies any sadness, depression, crying spells or suicidal thoughts.  Continue venlafaxine.      3. Attention deficit hyperactivity disorder (ADHD), predominantly inattentive  type  Overview:  Stable/patient doing well on current treatment, will continue to closely monitor, follow-up in 3 months. 6 prescriptions printed and hand given to patient at office visit today for 3-month supply. Risks/benefits/side effects of medication discussed with patient. Patient denies any insomnia or palpitations.     07/03/2023: Patient is doing well on medications; refills x3 for each medication given.    Narcotics agreement updated.    10/05/2023: Patient is doing well on medications; refills x3 for each medication given.    10/05/2023:  Patient took Vyvanse 50 mg secondary to availability issues.  Will resume 70 mg dosage; refills x3 given.  Continue Adderall 20 mg; refills x3 given.    01/18/2024: Patient is doing well on medications; refills x3 for each medication given.    04/15/2024: Patient is doing well on medications; refills x3 for each medication given.    07/23/2024: Patient is doing well on medications; refills x3 for each medications sent.    Assessment & Plan:  Patient is doing well on medications; refills x3 for each medications sent.     Orders:  -     lisdexamfetamine (VYVANSE) 70 MG capsule; Take 1 capsule (70 mg total) by mouth every morning.  Dispense: 30 capsule; Refill: 0  -     lisdexamfetamine (VYVANSE) 70 MG capsule; Take 1 capsule (70 mg total) by mouth every morning. Fill: 2/24/2025.  Dispense: 30 capsule; Refill: 0  -     lisdexamfetamine (VYVANSE) 70 MG capsule; Take 1 capsule (70 mg total) by mouth every morning. Fill: 3/24/2025.  Dispense: 30 capsule; Refill: 0  -     dextroamphetamine-amphetamine (ADDERALL) 20 mg tablet; Take 1 tablet by mouth once daily. In the afternoon  Dispense: 30 tablet; Refill: 0  -     dextroamphetamine-amphetamine (ADDERALL) 20 mg tablet; Take 1 tablet by mouth daily in afternoon. Fill: 2/24/2025.  Dispense: 30 tablet; Refill: 0  -     dextroamphetamine-amphetamine (ADDERALL) 20 mg tablet; Take 1 tablet by mouth after lunch. Fill: 3/24/2025.   Dispense: 30 tablet; Refill: 0    4. Primary hypertension  Overview:  Stable and well controlled at this time. Continue current treatment. Limit salt in diet. Monitor BP at home.     07/03/2023: Patient's blood pressure continues to be well controlled.    Continue current medication.    Patient encouraged to lose weight.    10/05/2023:  His blood pressure is well controlled.    11/03/2023: His blood pressure is well controlled; continue current medication.    01/18/2024:  Patient's blood pressure is 136/86 today; he has not been checking his blood pressures.  Patient advised to monitor pressures and let us know how they are doing.  Limit sodium consumption.    Patient encouraged to increase physical activity, exercise and lose weight.    04/15/2024:  His blood pressure is not ideally controlled.    Patient encouraged to lose weight.    Limit sodium consumption.    Patient advised to monitor pressures and let us know how they are doing.    07/23/2024: Patient states his blood pressure has been controlled; his blood pressure this a.m. was 112/84.        Assessment & Plan:  Patient's blood pressures has been in the 120s-130 over 80s-90.  Continue current medications; refills for lisinopril sent.      5. Obstructive sleep apnea syndrome  Overview:  07/23/2024: Patient did a home sleep study which confirmed that he had obstructive sleep apnea.    Patient picked up mask this a.m.    Assessment & Plan:  Patient is compliant with and doing well on CPAP therapy.      Other orders  -     lisinopriL (PRINIVIL,ZESTRIL) 20 MG tablet; Take 1 tablet (20 mg total) by mouth once daily.  Dispense: 30 tablet; Refill: 5      Note: A total of 30 minutes was spent reviewing patient's chart, talking to patient and documentation.  This was an audio-visual visit.        ..No follow-ups on file.       No future appointments.

## 2025-01-24 NOTE — ASSESSMENT & PLAN NOTE
Patient's blood pressures has been in the 120s-130 over 80s-90.  Continue current medications; refills for lisinopril sent.

## 2025-01-24 NOTE — ASSESSMENT & PLAN NOTE
Patient is doing well on medications; refills x3 for each medications sent.    Constitutional: no fever, chills    All other ROS neg except as per HPI

## 2025-03-21 DIAGNOSIS — F32.A DEPRESSIVE DISORDER: Primary | ICD-10-CM

## 2025-03-21 RX ORDER — VENLAFAXINE HYDROCHLORIDE 75 MG/1
75 CAPSULE, EXTENDED RELEASE ORAL
Qty: 30 CAPSULE | Refills: 0 | Status: SHIPPED | OUTPATIENT
Start: 2025-03-21

## 2025-04-21 DIAGNOSIS — F32.A DEPRESSIVE DISORDER: ICD-10-CM

## 2025-04-21 RX ORDER — VENLAFAXINE HYDROCHLORIDE 75 MG/1
75 CAPSULE, EXTENDED RELEASE ORAL
Qty: 30 CAPSULE | Refills: 0 | Status: SHIPPED | OUTPATIENT
Start: 2025-04-21

## 2025-04-21 NOTE — PROGRESS NOTES
I have reviewed the patient name: Gerardo Be, YOB: 1995   I have verbally reviewed the past medical history, active medication list, and allergies with the patient (parent/ legal guardian for a patient under the age of 18 years old) and verified with picture ID if applicable .  I have verified that this patient is in the [place].    Medication Refill (3 month med refill)       HPI:    Patient presents for recheck/refill medications.  Patient states medications are working well; he is able to concentrate, focus and passed.  He denies anxiety, palpitations, unintentional weight loss, headaches or dry mouth.   reviewed.    ROS:    He has been feeling well.    He has been sleeping   His appetite has been good.  He has been eating smaller portions.  His anxiety and depression have been doing well.  His blood pressures has been in the 110s over 70s.    Answers submitted by the patient for this visit:  Review of Systems Questionnaire (Submitted on 4/21/2025)  activity change: Yes  unexpected weight change: No  neck pain: No  hearing loss: No  rhinorrhea: No  trouble swallowing: No  eye discharge: No  visual disturbance: No  chest tightness: No  wheezing: No  chest pain: No  palpitations: No  blood in stool: No  constipation: No  vomiting: No  diarrhea: No  polydipsia: No  polyuria: No  difficulty urinating: No  urgency: No  hematuria: No  joint swelling: No  arthralgias: No  headaches: No  weakness: No  confusion: No  dysphoric mood: No        PE:    ..There were no vitals taken for this visit.     Via telemedicine, he is well-developed well-nourished obese white male in no apparent distress. He is alert and oriented.  He has a good affect.        1. Attention deficit hyperactivity disorder (ADHD), predominantly inattentive type  Overview:  Stable/patient doing well on current treatment, will continue to closely monitor, follow-up in 3 months. 6 prescriptions printed and hand given to  patient at office visit today for 3-month supply. Risks/benefits/side effects of medication discussed with patient. Patient denies any insomnia or palpitations.     07/03/2023: Patient is doing well on medications; refills x3 for each medication given.    Narcotics agreement updated.    10/05/2023: Patient is doing well on medications; refills x3 for each medication given.    10/05/2023:  Patient took Vyvanse 50 mg secondary to availability issues.  Will resume 70 mg dosage; refills x3 given.  Continue Adderall 20 mg; refills x3 given.    01/18/2024: Patient is doing well on medications; refills x3 for each medication given.    04/15/2024: Patient is doing well on medications; refills x3 for each medication given.    07/23/2024: Patient is doing well on medications; refills x3 for each medications sent.    Assessment & Plan:  Patient is doing well on medications; refills x3 for each medication sent.    Orders:  -     dextroamphetamine-amphetamine (ADDERALL) 20 mg tablet; Take 1 tablet by mouth once daily. In the afternoon  Dispense: 30 tablet; Refill: 0  -     dextroamphetamine-amphetamine (ADDERALL) 20 mg tablet; Take 1 tablet by mouth after lunch. Fill:  05/22/2025.  Dispense: 30 tablet; Refill: 0  -     dextroamphetamine-amphetamine (ADDERALL) 20 mg tablet; Take 1 tablet by mouth daily in afternoon. Fill: 6/21/2025.  Dispense: 30 tablet; Refill: 0  -     lisdexamfetamine (VYVANSE) 70 MG capsule; Take 1 capsule (70 mg total) by mouth every morning.  Dispense: 30 capsule; Refill: 0  -     lisdexamfetamine (VYVANSE) 70 MG capsule; Take 1 capsule (70 mg total) by mouth every morning. Fill: 5/22/2025.  Dispense: 30 capsule; Refill: 0  -     lisdexamfetamine (VYVANSE) 70 MG capsule; Take 1 capsule (70 mg total) by mouth every morning. Fill: 6/21/2025.  Dispense: 30 capsule; Refill: 0    2. Primary hypertension  Overview:  Stable and well controlled at this time. Continue current treatment. Limit salt in diet. Monitor  BP at home.     07/03/2023: Patient's blood pressure continues to be well controlled.    Continue current medication.    Patient encouraged to lose weight.    10/05/2023:  His blood pressure is well controlled.    11/03/2023: His blood pressure is well controlled; continue current medication.    01/18/2024:  Patient's blood pressure is 136/86 today; he has not been checking his blood pressures.  Patient advised to monitor pressures and let us know how they are doing.  Limit sodium consumption.    Patient encouraged to increase physical activity, exercise and lose weight.    04/15/2024:  His blood pressure is not ideally controlled.    Patient encouraged to lose weight.    Limit sodium consumption.    Patient advised to monitor pressures and let us know how they are doing.    07/23/2024: Patient states his blood pressure has been controlled; his blood pressure this a.m. was 112/84.        Assessment & Plan:  Well controlled; his blood pressures average in the 110s over 70s.    Continue lisinopril hydrochlorothiazide; refills sent.    Orders:  -     lisinopriL-hydrochlorothiazide (PRINZIDE,ZESTORETIC) 20-25 mg Tab; Take 1 tablet by mouth once daily.  Dispense: 90 tablet; Refill: 1    3. Obstructive sleep apnea syndrome  Overview:  07/23/2024: Patient did a home sleep study which confirmed that he had obstructive sleep apnea.    Patient picked up mask this a.m.    Assessment & Plan:  Patient is compliant with and doing well on CPAP therapy.        Note: A total of 20 minutes was spent reviewing patient's chart, talking to patient and documentation.  This was an audio-visual visit.  Patient was at work at time of visit.        ..No follow-ups on file.       No future appointments.

## 2025-04-22 ENCOUNTER — OFFICE VISIT (OUTPATIENT)
Dept: PRIMARY CARE CLINIC | Facility: CLINIC | Age: 30
End: 2025-04-22
Payer: COMMERCIAL

## 2025-04-22 DIAGNOSIS — I10 PRIMARY HYPERTENSION: ICD-10-CM

## 2025-04-22 DIAGNOSIS — F90.0 ATTENTION DEFICIT HYPERACTIVITY DISORDER (ADHD), PREDOMINANTLY INATTENTIVE TYPE: Primary | ICD-10-CM

## 2025-04-22 DIAGNOSIS — G47.33 OBSTRUCTIVE SLEEP APNEA SYNDROME: ICD-10-CM

## 2025-04-22 RX ORDER — DEXTROAMPHETAMINE SACCHARATE, AMPHETAMINE ASPARTATE, DEXTROAMPHETAMINE SULFATE AND AMPHETAMINE SULFATE 5; 5; 5; 5 MG/1; MG/1; MG/1; MG/1
TABLET ORAL
Qty: 30 TABLET | Refills: 0 | Status: SHIPPED | OUTPATIENT
Start: 2025-04-22

## 2025-04-22 RX ORDER — LISDEXAMFETAMINE DIMESYLATE 70 MG/1
70 CAPSULE ORAL EVERY MORNING
Qty: 30 CAPSULE | Refills: 0 | Status: SHIPPED | OUTPATIENT
Start: 2025-04-22

## 2025-04-22 RX ORDER — LISINOPRIL AND HYDROCHLOROTHIAZIDE 20; 25 MG/1; MG/1
1 TABLET ORAL DAILY
Qty: 90 TABLET | Refills: 1 | Status: SHIPPED | OUTPATIENT
Start: 2025-04-22 | End: 2025-10-19

## 2025-04-22 RX ORDER — LISINOPRIL AND HYDROCHLOROTHIAZIDE 20; 25 MG/1; MG/1
1 TABLET ORAL DAILY
Qty: 90 TABLET | Refills: 3 | Status: SHIPPED | OUTPATIENT
Start: 2025-04-22 | End: 2025-04-22 | Stop reason: SDUPTHER

## 2025-04-22 RX ORDER — DEXTROAMPHETAMINE SACCHARATE, AMPHETAMINE ASPARTATE, DEXTROAMPHETAMINE SULFATE AND AMPHETAMINE SULFATE 5; 5; 5; 5 MG/1; MG/1; MG/1; MG/1
1 TABLET ORAL DAILY
Qty: 30 TABLET | Refills: 0 | Status: SHIPPED | OUTPATIENT
Start: 2025-04-22

## 2025-04-22 NOTE — ASSESSMENT & PLAN NOTE
Well controlled; his blood pressures average in the 110s over 70s.    Continue lisinopril hydrochlorothiazide; refills sent.

## 2025-04-25 ENCOUNTER — PATIENT OUTREACH (OUTPATIENT)
Facility: CLINIC | Age: 30
End: 2025-04-25
Payer: COMMERCIAL

## 2025-04-25 NOTE — PROGRESS NOTES
[facility] Outreach    No call needed, no future appt. on file, follow in 6 months.  Hep C never done.

## 2025-05-22 DIAGNOSIS — F32.A DEPRESSIVE DISORDER: ICD-10-CM

## 2025-05-22 RX ORDER — VENLAFAXINE HYDROCHLORIDE 75 MG/1
75 CAPSULE, EXTENDED RELEASE ORAL
Qty: 30 CAPSULE | Refills: 0 | Status: SHIPPED | OUTPATIENT
Start: 2025-05-22

## 2025-06-22 DIAGNOSIS — F32.A DEPRESSIVE DISORDER: ICD-10-CM

## 2025-06-24 RX ORDER — VENLAFAXINE HYDROCHLORIDE 75 MG/1
75 CAPSULE, EXTENDED RELEASE ORAL
Qty: 30 CAPSULE | Refills: 0 | Status: SHIPPED | OUTPATIENT
Start: 2025-06-24

## 2025-07-24 DIAGNOSIS — F32.A DEPRESSIVE DISORDER: ICD-10-CM

## 2025-07-24 RX ORDER — VENLAFAXINE HYDROCHLORIDE 75 MG/1
75 CAPSULE, EXTENDED RELEASE ORAL
Qty: 30 CAPSULE | Refills: 0 | Status: SHIPPED | OUTPATIENT
Start: 2025-07-24

## 2025-07-28 DIAGNOSIS — F90.0 ATTENTION DEFICIT HYPERACTIVITY DISORDER (ADHD), PREDOMINANTLY INATTENTIVE TYPE: ICD-10-CM

## 2025-07-28 RX ORDER — LISDEXAMFETAMINE DIMESYLATE 70 MG/1
70 CAPSULE ORAL EVERY MORNING
Qty: 30 CAPSULE | Refills: 0 | Status: SHIPPED | OUTPATIENT
Start: 2025-07-28

## 2025-07-28 RX ORDER — DEXTROAMPHETAMINE SACCHARATE, AMPHETAMINE ASPARTATE, DEXTROAMPHETAMINE SULFATE AND AMPHETAMINE SULFATE 5; 5; 5; 5 MG/1; MG/1; MG/1; MG/1
TABLET ORAL
Qty: 30 TABLET | Refills: 0 | Status: SHIPPED | OUTPATIENT
Start: 2025-07-28

## 2025-07-29 NOTE — PROGRESS NOTES
The patient location is: [place]  The chief complaint leading to consultation is:  Refill medications    Visit type: audiovisual    Face to Face time with patient: 12  20 minutes of total time spent on the encounter, which includes face to face time and non-face to face time preparing to see the patient (eg, review of tests), Obtaining and/or reviewing separately obtained history, Documenting clinical information in the electronic or other health record, Independently interpreting results (not separately reported) and communicating results to the patient/family/caregiver, or Care coordination (not separately reported).         Each patient to whom he or she provides medical services by telemedicine is:  (1) informed of the relationship between the physician and patient and the respective role of any other health care provider with respect to management of the patient; and (2) notified that he or she may decline to receive medical services by telemedicine and may withdraw from such care at any time.    Notes:     Medication Refill (3 month med refill)       HPI:    Patient presents for recheck/refill medications.  Patient states medications are working well; he is able to concentrate, focus and passed.  He denies anxiety, palpitations, unintentional weight loss, headaches or dry mouth.   reviewed.      Current Outpatient Medications   Medication Instructions    dextroamphetamine-amphetamine (ADDERALL) 20 mg tablet Take 1 tablet by mouth daily and afternoon. Fill: 10/02/2025.    dextroamphetamine-amphetamine (ADDERALL) 20 mg tablet Take 1 tablet by mouth after lunch. Fill:  09/03/2025.    dextroamphetamine-amphetamine (ADDERALL) 20 mg tablet Take 1 tablet by mouth daily in afternoon. Fill: 6/21/2025.    lisdexamfetamine (VYVANSE) 70 mg, Oral, Every morning    lisdexamfetamine (VYVANSE) 70 mg, Oral, Every morning, Fill:  09/03/2025.    lisdexamfetamine (VYVANSE) 70 mg, Oral, Every morning, Fill: 10/02/2025.     lisinopriL-hydrochlorothiazide (PRINZIDE,ZESTORETIC) 20-25 mg Tab 1 tablet, Oral, Daily    venlafaxine (EFFEXOR-XR) 75 mg, Oral         ROS:    He has been feeling well.    He has been sleeping well.  His appetite has been good.    His anxiety and depression have been doing well.  He has not been checking his blood pressures regularly.      PE:    ..There were no vitals taken for this visit.     General: He is well-developed well-nourished obese white male in no apparent distress.  He is alert and oriented.  He interacts well.  He has a pleasant affect.        1. Attention deficit hyperactivity disorder (ADHD), predominantly inattentive type  Overview:  Stable/patient doing well on current treatment, will continue to closely monitor, follow-up in 3 months. 6 prescriptions printed and hand given to patient at office visit today for 3-month supply. Risks/benefits/side effects of medication discussed with patient. Patient denies any insomnia or palpitations.     07/03/2023: Patient is doing well on medications; refills x3 for each medication given.    Narcotics agreement updated.    10/05/2023: Patient is doing well on medications; refills x3 for each medication given.    10/05/2023:  Patient took Vyvanse 50 mg secondary to availability issues.  Will resume 70 mg dosage; refills x3 given.  Continue Adderall 20 mg; refills x3 given.    01/18/2024: Patient is doing well on medications; refills x3 for each medication given.    04/15/2024: Patient is doing well on medications; refills x3 for each medication given.    07/23/2024: Patient is doing well on medications; refills x3 for each medications sent.    Assessment & Plan:  Patient is doing well on medications; refills x3 for each medications sent.    Orders:  -     dextroamphetamine-amphetamine (ADDERALL) 20 mg tablet; Take 1 tablet by mouth daily and afternoon. Fill: 10/02/2025.  Dispense: 30 tablet; Refill: 0  -     dextroamphetamine-amphetamine (ADDERALL) 20 mg  tablet; Take 1 tablet by mouth after lunch. Fill:  09/03/2025.  Dispense: 30 tablet; Refill: 0  -     dextroamphetamine-amphetamine (ADDERALL) 20 mg tablet; Take 1 tablet by mouth daily in afternoon. Fill: 6/21/2025.  Dispense: 30 tablet; Refill: 0  -     lisdexamfetamine (VYVANSE) 70 MG capsule; Take 1 capsule (70 mg total) by mouth every morning.  Dispense: 30 capsule; Refill: 0  -     lisdexamfetamine (VYVANSE) 70 MG capsule; Take 1 capsule (70 mg total) by mouth every morning. Fill:  09/03/2025.  Dispense: 30 capsule; Refill: 0  -     lisdexamfetamine (VYVANSE) 70 MG capsule; Take 1 capsule (70 mg total) by mouth every morning. Fill: 10/02/2025.  Dispense: 30 capsule; Refill: 0    2. Primary hypertension  Overview:  Stable and well controlled at this time. Continue current treatment. Limit salt in diet. Monitor BP at home.     07/03/2023: Patient's blood pressure continues to be well controlled.    Continue current medication.    Patient encouraged to lose weight.    10/05/2023:  His blood pressure is well controlled.    11/03/2023: His blood pressure is well controlled; continue current medication.    01/18/2024:  Patient's blood pressure is 136/86 today; he has not been checking his blood pressures.  Patient advised to monitor pressures and let us know how they are doing.  Limit sodium consumption.    Patient encouraged to increase physical activity, exercise and lose weight.    04/15/2024:  His blood pressure is not ideally controlled.    Patient encouraged to lose weight.    Limit sodium consumption.    Patient advised to monitor pressures and let us know how they are doing.    07/23/2024: Patient states his blood pressure has been controlled; his blood pressure this a.m. was 112/84.        Assessment & Plan:  Patient advised to check blood pressures regularly.                ..No follow-ups on file.       No future appointments.

## 2025-08-04 ENCOUNTER — OFFICE VISIT (OUTPATIENT)
Dept: PRIMARY CARE CLINIC | Facility: CLINIC | Age: 30
End: 2025-08-04
Payer: COMMERCIAL

## 2025-08-04 DIAGNOSIS — I10 PRIMARY HYPERTENSION: ICD-10-CM

## 2025-08-04 DIAGNOSIS — F90.0 ATTENTION DEFICIT HYPERACTIVITY DISORDER (ADHD), PREDOMINANTLY INATTENTIVE TYPE: Primary | ICD-10-CM

## 2025-08-04 PROCEDURE — 98005 SYNCH AUDIO-VIDEO EST LOW 20: CPT | Mod: 95,,, | Performed by: FAMILY MEDICINE

## 2025-08-04 PROCEDURE — 1159F MED LIST DOCD IN RCRD: CPT | Mod: CPTII,95,, | Performed by: FAMILY MEDICINE

## 2025-08-04 PROCEDURE — 1160F RVW MEDS BY RX/DR IN RCRD: CPT | Mod: CPTII,95,, | Performed by: FAMILY MEDICINE

## 2025-08-04 PROCEDURE — 4010F ACE/ARB THERAPY RXD/TAKEN: CPT | Mod: CPTII,95,, | Performed by: FAMILY MEDICINE

## 2025-08-04 RX ORDER — LISDEXAMFETAMINE DIMESYLATE 70 MG/1
70 CAPSULE ORAL EVERY MORNING
Qty: 30 CAPSULE | Refills: 0 | Status: SHIPPED | OUTPATIENT
Start: 2025-08-04

## 2025-08-04 RX ORDER — DEXTROAMPHETAMINE SACCHARATE, AMPHETAMINE ASPARTATE, DEXTROAMPHETAMINE SULFATE AND AMPHETAMINE SULFATE 5; 5; 5; 5 MG/1; MG/1; MG/1; MG/1
TABLET ORAL
Qty: 30 TABLET | Refills: 0 | Status: SHIPPED | OUTPATIENT
Start: 2025-08-04

## 2025-08-04 RX ORDER — LISDEXAMFETAMINE DIMESYLATE 70 MG/1
70 CAPSULE ORAL EVERY MORNING
Qty: 30 CAPSULE | Refills: 0 | Status: SHIPPED | OUTPATIENT
Start: 2025-08-04 | End: 2025-09-03

## 2025-08-04 RX ORDER — DEXTROAMPHETAMINE SACCHARATE, AMPHETAMINE ASPARTATE, DEXTROAMPHETAMINE SULFATE AND AMPHETAMINE SULFATE 5; 5; 5; 5 MG/1; MG/1; MG/1; MG/1
TABLET ORAL
Qty: 30 TABLET | Refills: 0 | Status: SHIPPED | OUTPATIENT
Start: 2025-08-04 | End: 2025-08-05 | Stop reason: SDUPTHER

## 2025-08-05 ENCOUNTER — TELEPHONE (OUTPATIENT)
Dept: PRIMARY CARE CLINIC | Facility: CLINIC | Age: 30
End: 2025-08-05
Payer: COMMERCIAL

## 2025-08-05 DIAGNOSIS — F90.0 ATTENTION DEFICIT HYPERACTIVITY DISORDER (ADHD), PREDOMINANTLY INATTENTIVE TYPE: ICD-10-CM

## 2025-08-05 RX ORDER — DEXTROAMPHETAMINE SACCHARATE, AMPHETAMINE ASPARTATE, DEXTROAMPHETAMINE SULFATE AND AMPHETAMINE SULFATE 5; 5; 5; 5 MG/1; MG/1; MG/1; MG/1
TABLET ORAL
Qty: 30 TABLET | Refills: 0 | Status: SHIPPED | OUTPATIENT
Start: 2025-08-05

## 2025-08-05 NOTE — TELEPHONE ENCOUNTER
Copied from CRM #[id number]. Topic: Medications - Pharmacy  >> Aug 5, 2025 12:06 PM Ronny wrote:  .Who Called: Arlet Kaur    Caller is requesting assistance/information from provider's office.    Symptoms (please be specific): N/A   How long has patient had these symptoms:  N/A  List of preferred pharmacies on file (remove unneeded): [unfilled]  If different, enter pharmacy into here including location and phone number: N/A      Preferred Method of Contact: Phone Call  Patient's Preferred Phone Number on File: [phone]     Best Call Back Number, if different:    Additional Information: Need clarification on a script that was sent yesterday and was start date 6/25/25. Requesting a cb. Please advise, thank you

## 2025-08-07 ENCOUNTER — TELEPHONE (OUTPATIENT)
Dept: PRIMARY CARE CLINIC | Facility: CLINIC | Age: 30
End: 2025-08-07
Payer: COMMERCIAL

## 2025-08-07 NOTE — TELEPHONE ENCOUNTER
Copied from CRM #[id number]. Topic: Medications - Pharmacy  >> Aug 6, 2025  3:05 PM Cathy wrote:  Who Called: lisa in regards to Gerardo Be    Pharmacy is calling to request assistance with Rx    Pharmacy name and phone number: [facility] [address] [facility], [place] - [address]   Phone: [phone]  Fax: [phone]        Pharmacy contact: lisa  Patient Name: gerardo  Prescription Name: dextroamphetamine-amphetamine (ADDERALL) 20 mg tablet 30 tablet   What do they need to clarify?:fill date         Preferred Method of Contact: Phone Call  Patient's Preferred Phone Number on File: [phone]   Best Call Back Number, if different:**lisa [phone]**  Additional Information: pharmacy call, please advise, thanks  >> Aug 6, 2025  3:17 PM Nurse Abdi wrote:    ----- Message -----  From: Cathy Greenwood  Sent: 8/6/2025   3:07 PM CDT  To: Abdi [facility] Staff

## 2025-08-25 DIAGNOSIS — F32.A DEPRESSIVE DISORDER: ICD-10-CM

## 2025-08-25 RX ORDER — VENLAFAXINE HYDROCHLORIDE 75 MG/1
75 CAPSULE, EXTENDED RELEASE ORAL
Qty: 30 CAPSULE | Refills: 0 | Status: SHIPPED | OUTPATIENT
Start: 2025-08-25